# Patient Record
Sex: FEMALE | Race: WHITE | URBAN - METROPOLITAN AREA
[De-identification: names, ages, dates, MRNs, and addresses within clinical notes are randomized per-mention and may not be internally consistent; named-entity substitution may affect disease eponyms.]

---

## 2024-10-31 ENCOUNTER — OFFICE VISIT (OUTPATIENT)
Age: 29
End: 2024-10-31

## 2024-10-31 VITALS
RESPIRATION RATE: 18 BRPM | BODY MASS INDEX: 29.12 KG/M2 | OXYGEN SATURATION: 95 % | SYSTOLIC BLOOD PRESSURE: 131 MMHG | HEART RATE: 80 BPM | HEIGHT: 66 IN | TEMPERATURE: 98.2 F | DIASTOLIC BLOOD PRESSURE: 84 MMHG | WEIGHT: 181.2 LBS

## 2024-10-31 DIAGNOSIS — B37.2 CANDIDAL INTERTRIGO: ICD-10-CM

## 2024-10-31 DIAGNOSIS — B86 SCABIES: Primary | ICD-10-CM

## 2024-10-31 RX ORDER — FLUCONAZOLE 100 MG/1
100 TABLET ORAL DAILY
Qty: 7 TABLET | Refills: 0 | Status: SHIPPED | OUTPATIENT
Start: 2024-10-31 | End: 2024-11-07

## 2024-10-31 RX ORDER — PERMETHRIN 50 MG/G
CREAM TOPICAL
Qty: 60 G | Refills: 1 | Status: SHIPPED | OUTPATIENT
Start: 2024-10-31

## 2024-10-31 RX ORDER — AMOXICILLIN 500 MG/1
500 CAPSULE ORAL 3 TIMES DAILY
COMMUNITY

## 2024-10-31 NOTE — PROGRESS NOTES
Beatris Zimmerman (:  1995) is a 29 y.o. female,New patient, here for evaluation of the following chief complaint(s):  Rash (Face,neck,bilateral arms, breast, abd area x 4 days )      ASSESSMENT/PLAN:    ICD-10-CM    1. Scabies  B86 permethrin (ELIMITE) 5 % cream      2. Candidal intertrigo  B37.2 fluconazole (DIFLUCAN) 100 MG tablet        MDM: The patient presents with two different types of rash.  She has chronic intertrigo and a newer pruritic rash on her arms and neck.  Chaperone was present during exam. She has intertrigo under breast and a diffuse maculopapular eruption of the arms and torso concerning for scabies. She will be given an antifungal and Elimite. She should follow-up with her PCP.     Dx Disposition: Home  Education and handout provided on diagnosis and management of symptoms.   AVS reviewed with patient. Follow up as needed in UC or with PCP for new or worsening symptoms.   Return in about 1 week (around 2024) for recheck.    SUBJECTIVE/OBJECTIVE:  The patient is a 29 year old female who presents with a 4 day history of a rash. The patient reports that he/she has noticed a red, scabbed, Itchy, macular, papular. The rash is located along the face, neck, arms. She reports that they were exposed to nothing. She has a history of both eczema and intertrigo primarily under the breast. The patient has tried OTC topical steroid. No recent travel.  She has chronic intertrigo under her breat. She has not had any fever or chills. No known exposure to similar rash. No travel          Vitals:    10/31/24 1708   BP: 131/84   Site: Left Upper Arm   Position: Sitting   Pulse: 80   Resp: 18   Temp: 98.2 °F (36.8 °C)   TempSrc: Oral   SpO2: 95%   Weight: 82.2 kg (181 lb 3.2 oz)   Height: 1.676 m (5' 6\")       Review of Systems   Constitutional:  Negative for chills and fever.   Musculoskeletal:  Negative for arthralgias and myalgias.   Skin:  Positive for rash.   Neurological:  Negative for headaches.